# Patient Record
Sex: FEMALE | Race: WHITE
[De-identification: names, ages, dates, MRNs, and addresses within clinical notes are randomized per-mention and may not be internally consistent; named-entity substitution may affect disease eponyms.]

---

## 2018-09-30 ENCOUNTER — HOSPITAL ENCOUNTER (EMERGENCY)
Dept: HOSPITAL 97 - ER | Age: 68
Discharge: HOME | End: 2018-09-30
Payer: COMMERCIAL
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
## 2018-09-30 DIAGNOSIS — E87.6: ICD-10-CM

## 2018-09-30 DIAGNOSIS — Z88.2: ICD-10-CM
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
## 2018-09-30 DIAGNOSIS — E78.00: ICD-10-CM

## 2018-09-30 DIAGNOSIS — I10: ICD-10-CM

## 2018-09-30 DIAGNOSIS — G89.29: Primary | ICD-10-CM

## 2018-09-30 LAB
ALBUMIN SERPL BCP-MCNC: 4 G/DL (ref 3.4–5)
ALP SERPL-CCNC: 127 U/L (ref 45–117)
ALT SERPL W P-5'-P-CCNC: 43 U/L (ref 12–78)
AST SERPL W P-5'-P-CCNC: 36 U/L (ref 15–37)
BUN BLD-MCNC: 12 MG/DL (ref 7–18)
GLUCOSE SERPLBLD-MCNC: 110 MG/DL (ref 74–106)
HCT VFR BLD CALC: 41.5 % (ref 36–45)
INR BLD: 1.06
LYMPHOCYTES # SPEC AUTO: 2.9 K/UL (ref 0.7–4.9)
MAGNESIUM SERPL-MCNC: 2.1 MG/DL (ref 1.8–2.4)
MCH RBC QN AUTO: 32.3 PG (ref 27–35)
MCV RBC: 90.4 FL (ref 80–100)
NT-PROBNP SERPL-MCNC: 116 PG/ML (ref ?–125)
PMV BLD: 8.4 FL (ref 7.6–11.3)
POTASSIUM SERPL-SCNC: 3.2 MMOL/L (ref 3.5–5.1)
RBC # BLD: 4.59 M/UL (ref 3.86–4.86)
TROPONIN (EMERG DEPT USE ONLY): < 0.02 NG/ML (ref 0–0.04)

## 2018-09-30 PROCEDURE — 85025 COMPLETE CBC W/AUTO DIFF WBC: CPT

## 2018-09-30 PROCEDURE — 71045 X-RAY EXAM CHEST 1 VIEW: CPT

## 2018-09-30 PROCEDURE — 72192 CT PELVIS W/O DYE: CPT

## 2018-09-30 PROCEDURE — 93005 ELECTROCARDIOGRAM TRACING: CPT

## 2018-09-30 PROCEDURE — 83735 ASSAY OF MAGNESIUM: CPT

## 2018-09-30 PROCEDURE — 96374 THER/PROPH/DIAG INJ IV PUSH: CPT

## 2018-09-30 PROCEDURE — 96375 TX/PRO/DX INJ NEW DRUG ADDON: CPT

## 2018-09-30 PROCEDURE — 73552 X-RAY EXAM OF FEMUR 2/>: CPT

## 2018-09-30 PROCEDURE — 36415 COLL VENOUS BLD VENIPUNCTURE: CPT

## 2018-09-30 PROCEDURE — 72131 CT LUMBAR SPINE W/O DYE: CPT

## 2018-09-30 PROCEDURE — 83880 ASSAY OF NATRIURETIC PEPTIDE: CPT

## 2018-09-30 PROCEDURE — 80076 HEPATIC FUNCTION PANEL: CPT

## 2018-09-30 PROCEDURE — 85610 PROTHROMBIN TIME: CPT

## 2018-09-30 PROCEDURE — 84484 ASSAY OF TROPONIN QUANT: CPT

## 2018-09-30 PROCEDURE — 99284 EMERGENCY DEPT VISIT MOD MDM: CPT

## 2018-09-30 PROCEDURE — 72170 X-RAY EXAM OF PELVIS: CPT

## 2018-09-30 PROCEDURE — 73502 X-RAY EXAM HIP UNI 2-3 VIEWS: CPT

## 2018-09-30 PROCEDURE — 80048 BASIC METABOLIC PNL TOTAL CA: CPT

## 2018-09-30 NOTE — XMS REPORT
[facility]

 Created on:2016



Patient:Lily Bennett

Sex:Female

:1950

External Reference #:[id number]





Demographics







 Address  [address] [place] [id number]

 

 Phone  [phone]

 

 Preferred Language  Unknown

 

 Marital Status  Unknown

 

 Zoroastrianism Affiliation  Unknown

 

 Race  Unknown

 

 Ethnic Group  Unknown









Author







 Organization  [employer]









Care Team Providers







 Name  Role  Phone

 

 Fer Barrera  Provider Role  Unavailable









Allergies, Adverse Reactions, Alerts







 Substance  Reaction  Event Type

 

 Sulfa Allergy  Info Not Available  Drug Allergy







Encounters







 Encounter  Location  Date

 

 Echo report  [facility]  Dec 28, 2016

 

 Unknown  [facility]  Dec 08, 2016







Problems







 Problem Type  Condition  ICD-9 Code  Onset Dates  Condition Status

 

 Assessment  Reflux esophagitis  K21.0    Active

 

 Assessment  Essential (primary) hypertension  I10    Active

 

 Assessment  Hyperlipidemia, unspecified  E78.5    Active

 

 Problem  Hyperlipidemia, unspecified  E78.5    Active

 

 Problem  Reflux esophagitis  K21.0    Active

 

 Problem  Essential (primary) hypertension  I10    Active

 

 Assessment  Chest pain, unspecified  R07.9    Active

 

 Assessment  Palpitations  R00.2    Active

 

 Problem  Chest pain, unspecified  R07.9    Active

 

 Problem  Palpitations  R00.2    Active







Medications







 Medication  Code System  Code  Instructions  Start  End Date  Status  Dosage



         Date      

 

 carvedilol  MULTUM  82512  25 mg orally 2      Active  1 tab(s)



       times a day        

 

 Zantac 150  MULTUM  4742  150 mg orally qd      Active  1 tab(s)

 

 cyclobenzaprine  MULTUM  04777  10 mg orally 3      Active  1 tab(s)



       times a day        

 

 Crestor  MULTUM  31849  10 mg orally      Active  1 tab(s)



       once a day (at        



       bedtime)        

 

 Nexium  MULTUM  44286  40 mg orally      Active  1 cap(s)



       once a day        

 

 Colace  MULTUM  4971  sodium 100 mg      Active  1 cap(s)



       orally 2 times a        



       day        

 

 spironolactone  MULTUM  86316  25 mg orally      Active  1 tab(s)



       daily        

 

 Hydrocodone/Acetam  Unknown  0        Active  Unknown

 

 hydrochlorothiazide  MULTUM  68231  12.5 mg-100 mg      Active  1 tab(s)



 -losartan      orally once a        



       day        







Social History







 Social History Element  Qualifiers  Date Reported

 

 Caffeine:  yes.  frequency:  Dec 08, 2016

 

 Physical Activities:   yes.  Dec 08, 2016

 

 Tobacco Use:  .  Smoking Status: never smoker  Dec 08, 2016

 

 Occupation:  .  retired  Dec 08, 2016







Vital Signs







 Date/Time:  Dec 08, 2016

 

 Blood Pressure Diastolic  80 mm Hg

 

 Blood Pressure Systolic  118 mm Hg

 

 Weight  175 lbs

 

 Height  59 in







Summary Purpose

[employer] Submission

## 2018-09-30 NOTE — RAD REPORT
EXAM DESCRIPTION:  RAD - Chest Single View - 9/30/2018 4:59 am

 

CLINICAL HISTORY:  COUGH

Chest pain.

 

COMPARISON:  Chest Pa And Lat (2 Views) dated 1/18/2018; Chest Single View dated 1/18/2018; CHEST PA 
AND LAT 2 VIEW dated 11/6/2012; CHEST PA AND LAT 2 VIEW dated 3/22/2002

 

FINDINGS:  Portable technique limits examination quality.

 

The lungs are grossly clear. The heart is normal in size. No displaced fractures.

 

IMPRESSION:  No acute intrathoracic process suspected.

## 2018-09-30 NOTE — EDPHYS
Physician Documentation                                                                           

 [facility]                                                                

Name: Lily Bennett                                                                                

Age: 68 yrs                                                                                       

Sex: Female                                                                                       

: 1950                                                                                   

MRN: [id number]                                                                                   

Arrival Date: 2018                                                                          

Time: 04:00                                                                                       

Account#: [id number]                                                                            

Bed 5                                                                                             

Private MD: Ry Blanchard V                                                                      

ED Physician Nicolas Ingram                                                                      

HPI:                                                                                              

                                                                                             

04:06 This 68 yrs old  Female presents to ER via Wheelchair with complaints of Hip   pablito 

      Pain.                                                                                       

04:06 The patient or guardian reports decreased range of motion, pain. that occurred at home, pablito 

      sustained from a fall, There is no obvious deformity, The patient is able to ambulate       

      with assistance. The patient is able to bear partial body weight. There is no radiation     

      of the patient's discomfort. The complaints affect the pelvis. Onset: The                   

      symptoms/episode began/occurred just prior to arrival. Modifying factors: The symptoms      

      are alleviated by remaining still, the symptoms are aggravated by any movement.             

      Associated signs and symptoms: Pertinent positives: None. Severity of symptoms: At          

      their worst the symptoms were moderate, in the emergency department the symptoms have       

      improved, moderately. The patient has not experienced similar symptoms in the past.         

                                                                                                  

Historical:                                                                                       

- Allergies:                                                                                      

04:08 Sulfa (Sulfonamide Antibiotics);                                                        fc  

- Home Meds:                                                                                      

04:08 Norco 5-325 mg Oral tab 1 tab twice a day [Active]; carvedilol 25 mg oral tab 1 tab 2   fc  

      times per day [Active];                                                                     

04:24 Crestor 10 mg oral tab 1 tab once daily [Active]; omeprazole 40 mg Oral cpDR 1 cap once fc  

      daily [Active]; oxybutynin chloride 10 mg Oral tr24 1 tab daily [Active];                   

      losartan-hydrochlorothiazide 100-12.5 mg oral tab 1 tab once daily [Active]; tizanidine     

      2 mg oral tab 1 tabs three times a day [Active];                                            

- PMHx:                                                                                           

04:08 Chronic pain; Hypertension; High Cholesterol; GERD;                                     fc  

- PSHx:                                                                                           

04:08 bialteral hip replacements; Knee surgery; back surg; bilateral wrist surg;              fc  

      Hysterectomy; Cholecystectomy; hand surg;                                                   

                                                                                                  

- Immunization history:: Last tetanus immunization: unknown, Flu vaccine is not up to             

  date.                                                                                           

- Social history:: Smoking status: Patient/guardian denies using tobacco.                         

- Ebola Screening: : Patient negative for fever greater than or equal to 101.5 degrees            

  Fahrenheit, and additional compatible Ebola Virus Disease symptoms Patient denies               

  exposure to infectious person Patient denies travel to an Ebola-affected area in the            

  21 days before illness onset.                                                                   

- Family history:: not pertinent.                                                                 

                                                                                                  

                                                                                                  

ROS:                                                                                              

04:06 Constitutional: Negative for fever, chills, and weight loss, Eyes: Negative for injury, pablito 

      pain, redness, and discharge, ENT: Negative for injury, pain, and discharge, Neck:          

      Negative for injury, pain, and swelling, Cardiovascular: Negative for chest pain,           

      palpitations, and edema, Respiratory: Negative for shortness of breath, cough,              

      wheezing, and pleuritic chest pain, Abdomen/GI: Negative for abdominal pain, nausea,        

      vomiting, diarrhea, and constipation, Back: Negative for injury and pain, : Negative      

      for injury, bleeding, discharge, and swelling, Skin: Negative for injury, rash, and         

      discoloration, Neuro: Negative for headache, weakness, numbness, tingling, and seizure,     

      Psych: Negative for depression, anxiety, suicide ideation, homicidal ideation, and          

      hallucinations, Allergy/Immunology: Negative for hives, rash, and allergies, Endocrine:     

      Negative for neck swelling, polydipsia, polyuria, polyphagia, and marked weight             

      changes, Hematologic/Lymphatic: Negative for swollen nodes, abnormal bleeding, and          

      unusual bruising.                                                                           

04:06 MS/extremity: Positive for decreased range of motion, pain, swelling, tenderness, of        

      the pelvis, left hip, left gluteal fold, left inner thigh, left upper thigh and left        

      quadriceps.                                                                                 

                                                                                                  

Exam:                                                                                             

04:06 Constitutional:  This is a well developed, well nourished patient who is awake, alert,  pablito 

      and in no acute distress. Head/Face:  Normocephalic, atraumatic. Eyes:  Pupils equal        

      round and reactive to light, extra-ocular motions intact.  Lids and lashes normal.          

      Conjunctiva and sclera are non-icteric and not injected.  Cornea within normal limits.      

      Periorbital areas with no swelling, redness, or edema. ENT:  Nares patent. No nasal         

      discharge, no septal abnormalities noted.  Tympanic membranes are normal and external       

      auditory canals are clear.  Oropharynx with no redness, swelling, or masses, exudates,      

      or evidence of obstruction, uvula midline.  Mucous membranes moist. Neck:  Trachea          

      midline, no thyromegaly or masses palpated, and no cervical lymphadenopathy.  Supple,       

      full range of motion without nuchal rigidity, or vertebral point tenderness.  No            

      Meningismus. Chest/axilla:  Normal chest wall appearance and motion.  Nontender with no     

      deformity.  No lesions are appreciated. Cardiovascular:  Regular rate and rhythm with a     

      normal S1 and S2.  No gallops, murmurs, or rubs.  Normal PMI, no JVD.  No pulse             

      deficits. Respiratory:  Lungs have equal breath sounds bilaterally, clear to                

      auscultation and percussion.  No rales, rhonchi or wheezes noted.  No increased work of     

      breathing, no retractions or nasal flaring. Abdomen/GI:  Soft, non-tender, with normal      

      bowel sounds.  No distension or tympany.  No guarding or rebound.  No evidence of           

      tenderness throughout. Skin:  Warm, dry with normal turgor.  Normal color with no           

      rashes, no lesions, and no evidence of cellulitis. Neuro:  Awake and alert, GCS 15,         

      oriented to person, place, time, and situation.  Cranial nerves II-XII grossly intact.      

      Motor strength 5/5 in all extremities.  Sensory grossly intact.  Cerebellar exam            

      normal.  Normal gait. Psych:  Awake, alert, with orientation to person, place and time.     

       Behavior, mood, and affect are within normal limits.                                       

04:06 Back: ROM is painful, normal spinal alignment noted, CVA tenderness, is absent,             

      vertebral tenderness, is not appreciated, muscle spasm, is appreciated in the left low      

      back, left mid back, right mid back and right low back.                                     

                                                                                                  

Vital Signs:                                                                                      

04:00  / 80; Pulse 84; Resp 20; Temp 97.6(O); Pulse Ox 100% on R/A; Weight 79.83 kg     fc  

      (R); Height 5 ft. 9 in. (175.26 cm) (R); Pain 10/10;                                        

05:23  / 75; Pulse 78; Resp 20; Pulse Ox 100% on R/A; Pain 8/10;                        fc  

06:32  / 68; Pulse 72; Resp 20; Pulse Ox 100% on R/A; Pain 3/10;                        fc  

06:48  / 76; Pulse 76; Resp 20; Temp 98.0(O); Pulse Ox 100% on R/A; Pain 2/10;          fc  

04:00 Body Mass Index 25.99 (79.83 kg, 175.26 cm)                                             fc  

                                                                                                  

MDM:                                                                                              

04:09 Data reviewed: vital signs, nurses notes, lab test result(s), EKG, radiologic studies,  [facility] [place] 

      CT scan, plain films.                                                                       

04:10 Patient medically screened.                                                             [facility] [place] 

                                                                                                  

                                                                                             

04:06 Order name: Basic Metabolic Panel                                                       [facility] [place] 

                                                                                             

04:06 Order name: CBC with Diff; Complete Time: 05:26                                         [facility] [place] 

                                                                                             

04:06 Order name: LFT's                                                                       [facility] [place] 

                                                                                             

04:06 Order name: Magnesium; Complete Time: 05:                                             [facility] [place] 

                                                                                             

04:06 Order name: NT PRO-BNP; Complete Time: 05:                                            [facility] [place] 

                                                                                             

04:06 Order name: PT-INR; Complete Time: 05:                                                [facility] [place] 

                                                                                             

04:06 Order name: Troponin (emerg Dept Use Only); Complete Time: 05:                        [facility] [place] 

                                                                                             

04:06 Order name: XRAY Chest (1 view)                                                         [facility] [place] 

                                                                                             

04:06 Order name: Pelvis XRAY                                                                 [facility] [place] 

                                                                                             

04:06 Order name: CT Lumbar Spine Wo Con                                                      [facility] [place] 

                                                                                             

04:06 Order name: Hip Left 2 View XRAY                                                        [facility] [place] 

                                                                                             

04:06 Order name: Femur Left XRAY                                                             [facility] 

                                                                                             

04:07 Order name: Basic Metabolic Panel; Complete Time: 05:26                                 [facility]

                                                                                             

04:07 Order name: Liver (Hepatic) Function; Complete Time: 05:                              [facility]

                                                                                             

04:06 Order name: EKG; Complete Time: 04:07                                                   [facility] [place] 

                                                                                             

04:06 Order name: Cardiac monitoring; Complete Time: 04:                                    [facility] [place] 

                                                                                             

04:06 Order name: EKG - Nurse/Tech; Complete Time: 04:18                                      [facility] [place] 

                                                                                             

04:06 Order name: IV Saline Lock; Complete Time: 04:                                        [facility] [place] 

                                                                                             

04:06 Order name: Labs collected and sent; Complete Time: 04:09                               [facility] [place] 

                                                                                             

04:06 Order name: O2 Per Protocol; Complete Time: 04:                                       [facility] [place] 

                                                                                             

04:06 Order name: O2 Sat Monitoring; Complete Time: 04:                                     [facility] [place] 

                                                                                             

04:06 Order name: Femur Right XRAY                                                            [facility] [place] 

                                                                                             

05:45 Order name: Pelvis Wo Cont                                                              [facility]

                                                                                                  

Administered Medications:                                                                         

04:15 Drug: Zofran 4 mg Route: IVP; Site: right antecubital;                                  fc  

04:45 Follow up: Response: No adverse reaction; Nausea is decreased                           fc  

04:17 Drug: fentaNYL (PF) 50 mcg Route: IVP; Site: right antecubital;                         fc  

04:45 Follow up: Response: No adverse reaction; Nausea is decreased                           fc  

04:45 Follow up: Response: No adverse reaction; Pain is decreased                             fc  

05:18 Drug: Zofran 4 mg Route: IVP; Site: right antecubital;                                  fc  

05:46 Follow up: Response: No adverse reaction; Nausea is decreased                             

05:20 Drug: fentaNYL (PF) 50 mcg Route: IVP; Site: right antecubital;                         fc  

05:46 Follow up: Response: No adverse reaction; Pain is decreased                             fc  

06:32 Drug: Potassium Effervescent Tablet 25 mEq Route: PO;                                     

06:49 Follow up: Response: No adverse reaction; No change in condition                          

                                                                                                  

                                                                                                  

Disposition:                                                                                      

18 05:30 Discharged to Home. Impression: Fall due to bumping against object, Pain in        

  left hip, Other chronic pain, Hypokalemia.                                                      

- Condition is Stable.                                                                            

- Discharge Instructions: Joint Pain, Back Pain, Adult, Chronic Back Pain, Chronic                

  Pain, Potassium Content of Foods, Fall Prevention in the Home, Musculoskeletal Pain,            

  Back Pain, Adult, Easy-to-Read, Hip Pain.                                                       

- Prescriptions for Tylenol- Codeine #3 300-30 mg Oral Tablet - take 2 tablet by ORAL             

  route every 6 hours As needed; 30 tablet. Valium 2 mg Oral Tablet - take 1 tablet by            

  ORAL route every 8 hours As needed; 20 tablet.                                                  

- Medication Reconciliation Form, Thank You Letter, Antibiotic Education, Prescription            

  Opioid Use form.                                                                                

- Follow up: Ry Blanchard MD; When: 2 - 3 days; Reason: Recheck today's complaints,             

  Continuance of care, Re-evaluation by your physician. Follow up: Laz Alexander MD;            

  When: 2 - 3 days; Reason: Recheck today's complaints, Re-evaluation by your physician.          

- Problem is new.                                                                                 

- Symptoms have improved.                                                                         

                                                                                                  

                                                                                                  

                                                                                                  

Signatures:                                                                                       

Dispatcher [employer]                           Nicolas Ozuna MD MD cha Chretien, Felicia RN                   RN   fc                                                   

                                                                                                  

Corrections: (The following items were deleted from the chart)                                    

04:24 04:08 Home Meds: Crestor 20 mg oral tab 1 tab once daily; fc                              

04:24 04:08 Home Meds: Nexium 40 mg Oral cpDR 1 cap once daily; fc                              

04:24 04:08 Home Meds: Vesicare 10 mg oral tab 1 tab once daily; fc                           fc  

04:24 04:08 Home Meds: losartan 100 mg oral tab 1 tab once daily; fc                            

06:09 05:45 Hip Left Wo Con ordered. [facility]                                                     [facility]

06:09 05:45 Hip Right Wo Con ordered. [facility]                                                    [facility]

06:26 05:30 2018 05:30 Discharged to Home. Impression: Fall due to bumping against      pablito 

      object; Pain in left hip; Other chronic pain; Hypokalemia. Condition is Stable. Forms       

      are Medication Reconciliation Form, Thank You Letter, Antibiotic Education,                 

      Prescription Opioid Use. Follow up: Ry Blanchard; When: 2 - 3 days; Reason: Recheck         

      today's complaints, Continuance of care, Re-evaluation by your physician. Problem is        

      new. Symptoms have improved. [facility]                                                            

07:16 06:26 2018 05:30 Discharged to Home. Impression: Fall due to bumping against      fc  

      object; Pain in left hip; Other chronic pain; Hypokalemia. Condition is Stable.             

      Discharge Instructions: Joint Pain, Chronic Pain, Potassium Content of Foods, Fall          

      Prevention in the Home, Musculoskeletal Pain, Hip Pain, Back Pain, Adult, Chronic Back      

      Pain, Back Pain, Adult, Easy-to-Read. Prescriptions for Tylenol-Codeine #3 300-30 mg        

      Oral Tablet - take 2 tablet by ORAL route every 6 hours As needed; 30 tablet, Valium 2      

      mg Oral Tablet - take 1 tablet by ORAL route every 8 hours As needed; 20 tablet. and        

      Forms are Medication Reconciliation Form, Thank You Letter, Antibiotic Education,           

      Prescription Opioid Use. Follow up: Ry Blanchard; When: 2 - 3 days; Reason: Recheck         

      today's complaints, Continuance of care, Re-evaluation by your physician. Follow up:        

      Laz Alexander; When: 2 - 3 days; Reason: Recheck today's complaints, Re-evaluation by       

      your physician. Problem is new. Symptoms have improved. pablito                                 

                                                                                                  

**************************************************************************************************

## 2018-09-30 NOTE — RAD REPORT
EXAM DESCRIPTION:  RAD - Femur Right - 9/30/2018 5:00 am

 

CLINICAL HISTORY:  PAIN

History of fall

 

COMPARISON:  No comparisons

 

FINDINGS:  Right total hip arthroplasty is noted. Right total knee arthroplasty also seen. No hardwar
e complication suspected. No acute fracture seen.

## 2018-09-30 NOTE — XMS REPORT
[facility]

 Created on:2016



Patient:Lily Bennett

Sex:Female

:1950

External Reference #:[id number]





Demographics







 Address  [address] [place] [id number]

 

 Phone  [phone]

 

 Preferred Language  Unknown

 

 Marital Status  Unknown

 

 [employer] Affiliation  Unknown

 

 Race  Unknown

 

 Ethnic Group  Unknown









Author







 Organization  [employer]









Care Team Providers







 Name  Role  Phone

 

 Fer Barrera  Provider Role  Unavailable









Encounters







 Encounter  Location  Date

 

 Echo report  [facility]  Dec 28, 2016







Problems







 Problem Type  Condition  ICD-9 Code  Onset Dates  Condition Status

 

 Problem  Hyperlipidemia, unspecified  E78.5    Active

 

 Problem  Reflux esophagitis  K21.0    Active

 

 Problem  Essential (primary) hypertension  I10    Active

 

 Problem  Chest pain, unspecified  R07.9    Active

 

 Problem  Palpitations  R00.2    Active







Social History







 Social History Element  Qualifiers  Date Reported

 

 Caffeine:  yes.  frequency:  Dec 08, 2016

 

 Physical Activities:   yes.  Dec 08, 2016

 

 Tobacco Use:  .  Smoking Status: never smoker  Dec 08, 2016

 

 Occupation:  .  retired  Dec 08, 2016







Summary Purpose

[employer] Submission

## 2018-09-30 NOTE — ER
Nurse's Notes                                                                                     

 [facility]                                                                

Name: Lily Bennett                                                                                

Age: 68 yrs                                                                                       

Sex: Female                                                                                       

: 1950                                                                                   

MRN: [id number]                                                                                   

Arrival Date: 2018                                                                          

Time: 04:00                                                                                       

Account#: [id number]                                                                            

Bed 5                                                                                             

Private MD: Ry Blanchard V                                                                      

Diagnosis: Fall due to bumping against object;Pain in left hip;Other chronic pain;Hypokalemia     

                                                                                                  

Presentation:                                                                                     

                                                                                             

04:00 Presenting complaint: Patient states: that she feel one week ago off the toilet and     fc  

      hurt her left hip. States that she heard something pop. Pain has gotten progressively       

      worse since then. Transition of care: patient was not received from another setting of      

      care. Onset of symptoms was 2018. Risk Assessment: Do you want to hurt        

      yourself or someone else? Patient reports no desire to harm self or others. Initial         

      Sepsis Screen: Does the patient meet any 2 criteria? No. Patient's initial sepsis           

      screen is negative. Does the patient have a suspected source of infection? No.              

      Patient's initial sepsis screen is negative. Care prior to arrival: None.                   

04:00 Method Of Arrival: Wheelchair                                                           fc  

04:00 Acuity: BUFFY 3                                                                           fc  

                                                                                                  

Triage Assessment:                                                                                

04:00 General: Appears distressed, uncomfortable, slender, well groomed, Behavior is          fc  

      cooperative, appropriate for age, anxious. Pain: Complains of pain in left hip Pain         

      currently is 10 out of 10 on a pain scale. Quality of pain is described as aching,          

      sharp, shooting, Pain began 1 week ago Is continuous, Aggravated by increased activity,     

      repositioning, weight bearing. EENT: No deficits noted. Neuro: Level of Consciousness       

      is awake, alert, obeys commands, Oriented to person, place, time, situation.                

      Cardiovascular: No deficits noted. Respiratory: No deficits noted. GI: No deficits          

      noted. : No deficits noted. Derm: Skin is pink, warm \T\ dry. Musculoskeletal:            

      Capillary refill < 3 seconds, Range of motion: limited in left hip.                         

                                                                                                  

Historical:                                                                                       

- Allergies:                                                                                      

04:08 Sulfa (Sulfonamide Antibiotics);                                                        fc  

- Home Meds:                                                                                      

04:08 Norco 5-325 mg Oral tab 1 tab twice a day [Active]; carvedilol 25 mg oral tab 1 tab 2   fc  

      times per day [Active];                                                                     

04:24 Crestor 10 mg oral tab 1 tab once daily [Active]; omeprazole 40 mg Oral cpDR 1 cap once fc  

      daily [Active]; oxybutynin chloride 10 mg Oral tr24 1 tab daily [Active];                   

      losartan-hydrochlorothiazide 100-12.5 mg oral tab 1 tab once daily [Active]; tizanidine     

      2 mg oral tab 1 tabs three times a day [Active];                                            

- PMHx:                                                                                           

04:08 Chronic pain; Hypertension; High Cholesterol; GERD;                                     fc  

- PSHx:                                                                                           

04:08 bialteral hip replacements; Knee surgery; back surg; bilateral wrist surg;              fc  

      Hysterectomy; Cholecystectomy; hand surg;                                                   

                                                                                                  

- Immunization history:: Last tetanus immunization: unknown, Flu vaccine is not up to             

  date.                                                                                           

- Social history:: Smoking status: Patient/guardian denies using tobacco.                         

- Ebola Screening: : Patient negative for fever greater than or equal to 101.5 degrees            

  Fahrenheit, and additional compatible Ebola Virus Disease symptoms Patient denies               

  exposure to infectious person Patient denies travel to an Ebola-affected area in the            

  21 days before illness onset.                                                                   

- Family history:: not pertinent.                                                                 

                                                                                                  

                                                                                                  

Screenin:00 Abuse screen: Denies threats or abuse. Nutritional screening: No deficits noted.        fc  

      Tuberculosis screening: No symptoms or risk factors identified. Fall Risk None              

      identified.                                                                                 

                                                                                                  

Assessment:                                                                                       

04:05 Reassessment: No changes from previously documented assessment. Patient and/or family   fc  

      updated on plan of care and expected duration. Pain level reassessed. Patient is alert,     

      oriented x 3, equal unlabored respirations, skin warm/dry/pink. See triage assessment.      

      Dr Ingarm at bedside to examine pt.                                                       

04:26 Reassessment: Pt has gone to radiology dept for xrays via stretcher.                    fc  

05:11 Reassessment: No changes from previously documented assessment. Patient and/or family   fc  

      updated on plan of care and expected duration. Pain level reassessed. Patient is alert,     

      oriented x 3, equal unlabored respirations, skin warm/dry/pink. Pt has returned from        

      radiology. Having increased pain. Discussed with Dr Ingram. Pt to get Zofran and          

      Fentanyl.                                                                                   

05:46 Reassessment: Pt attempted to move in the bed and pain is back. Dr Ingram in to see   fc  

      and talk with pt. Pt to get CT of hips and pelvis before discharge.                         

06:33 Reassessment: No changes from previously documented assessment. Patient and/or family   fc  

      updated on plan of care and expected duration. Pain level reassessed. Patient is alert,     

      oriented x 3, equal unlabored respirations, skin warm/dry/pink. Pt given medication as      

      ordered. Is pending results of last Ct Scan.                                                

06:48 Reassessment: Dr Ingram discussed results with pt and  along with discharge    fc  

      instructions.                                                                               

                                                                                                  

Vital Signs:                                                                                      

04:00  / 80; Pulse 84; Resp 20; Temp 97.6(O); Pulse Ox 100% on R/A; Weight 79.83 kg     fc  

      (R); Height 5 ft. 9 in. (175.26 cm) (R); Pain 10/10;                                        

05:23  / 75; Pulse 78; Resp 20; Pulse Ox 100% on R/A; Pain 8/10;                        fc  

06:32  / 68; Pulse 72; Resp 20; Pulse Ox 100% on R/A; Pain 3/10;                        fc  

06:48  / 76; Pulse 76; Resp 20; Temp 98.0(O); Pulse Ox 100% on R/A; Pain 2/10;          fc  

04:00 Body Mass Index 25.99 (79.83 kg, 175.26 cm)                                               

                                                                                                  

ED Course:                                                                                        

04:00 Patient arrived in ED.                                                                  fc  

04:00 Ry Blanchard MD is Private Physician.                                                 fc  

04:00 Arm band placed on Patient placed in an exam room, on a stretcher.                      fc  

04:00 Patient has correct armband on for positive identification. Placed in gown. Bed in low  fc  

      position. Call light in reach. Side rails up X2. Pulse ox on. NIBP on.                      

04:00 Initial lab(s) drawn, by me. Inserted saline lock: 20 gauge in right antecubital area,  cc  

      using aseptic technique. Blood collected.                                                   

04:01 Triage completed.                                                                       fc  

04:03 Nicolas Ingram MD is Attending Physician.                                             [facility] 

04:15 X-ray completed. Patient tolerated procedure poorly. Patient moved to radiology via     ag1 

      stretcher.                                                                                  

04:21 EKG done, by ED staff, reviewed by Nicolas Ingram MD.                                   cc  

04:49 Radiology exam delayed due to PATIENT IS CURRENTLY IN XRAY.                             ag1 

04:58 XRAY Chest (1 view) In Process Unspecified.                                             EDMS

04:58 Pelvis XRAY In Process Unspecified.                                                     EDMS

04:58 Hip Left 2 View XRAY In Process Unspecified.                                            EDMS

04:58 Femur Left XRAY In Process Unspecified.                                                 EDMS

04:58 Femur Right XRAY In Process Unspecified.                                                EDMS

05:16 CT Lumbar Spine Wo Con In Process Unspecified.                                          EDMS

05:27 Ry Blanchard MD is Referral Physician.                                                [facility] 

06:06 Pelvis Wo Cont In Process Unspecified.                                                  EDMS

06:26 Laz Alexander MD is Referral Physician.                                               [facility] 

07:13 No provider procedures requiring assistance completed. IV discontinued, intact,         fc  

      bleeding controlled, No redness/swelling at site. Pressure dressing applied.                

                                                                                                  

Administered Medications:                                                                         

04:15 Drug: Zofran 4 mg Route: IVP; Site: right antecubital;                                  fc  

04:45 Follow up: Response: No adverse reaction; Nausea is decreased                           fc  

04:17 Drug: fentaNYL (PF) 50 mcg Route: IVP; Site: right antecubital;                         fc  

04:45 Follow up: Response: No adverse reaction; Nausea is decreased                           fc  

04:45 Follow up: Response: No adverse reaction; Pain is decreased                             fc  

05:18 Drug: Zofran 4 mg Route: IVP; Site: right antecubital;                                  fc  

05:46 Follow up: Response: No adverse reaction; Nausea is decreased                           fc  

05:20 Drug: fentaNYL (PF) 50 mcg Route: IVP; Site: right antecubital;                         fc  

05:46 Follow up: Response: No adverse reaction; Pain is decreased                             fc  

06:32 Drug: Potassium Effervescent Tablet 25 mEq Route: PO;                                   fc  

06:49 Follow up: Response: No adverse reaction; No change in condition                        fc  

                                                                                                  

                                                                                                  

Outcome:                                                                                          

05:30 Discharge ordered by MD.                                                                [facility] 

07:15 Discharged to home via wheelchair, with significant other.                              fc  

07:15 Condition: good                                                                             

07:15 Discharge instructions given to patient, significant other, Instructed on discharge         

      instructions, follow up and referral plans. no drinking with medication, no driving         

      heavy equipment, medication usage, Demonstrated understanding of instructions,              

      follow-up care, medications, Prescriptions given X 2.                                       

07:16 Patient left the ED.                                                                    fc  

                                                                                                  

Signatures:                                                                                       

Dispatcher [employer]                           Nicolas Ozuna MD MD cha Chretien, Felicia, RN                   RN                                                      

Aby Villar Ashley                             ag1                                                  

                                                                                                  

Corrections: (The following items were deleted from the chart)                                    

04:24 04:08 Home Meds: Crestor 20 mg oral tab 1 tab once daily; [facility]  

04: 04:08 Home Meds: Nexium 40 mg Oral cpDR 1 cap once daily; [facility]  

04::08 Home Meds: Vesicare 10 mg oral tab 1 tab once daily; [facility]  

04:: Home Meds: losartan 100 mg oral tab 1 tab once daily; [facility]  

                                                                                                  

**************************************************************************************************

## 2018-09-30 NOTE — RAD REPORT
EXAM DESCRIPTION:  CT - Spine Lumbar Wo Con - 9/30/2018 7:33 am

 

CLINICAL HISTORY:   Radiculopathy.

PAIN

 

COMPARISON:  L Spine With Bending Views dated 9/28/2018; Lumbar Spine 3 Views dated 5/10/2017

 

TECHNIQUE:  Axial noncontrast CT imaging of the lumbar spine was performed with coronal and sagittal 
re-formatted images.

 

All CT scans are performed using dose optimization technique as appropriate and may include automated
 exposure control or mA/KV adjustment according to patient size.

 

FINDINGS:  Postsurgical changes are present of posterior fusion spanning L2-5 with hardware in place.
 Chronic mild anterolisthesis of L3 on L4 and L4 on L5 is noted. Multilevel facet arthrosis is noted.


 

No acute fracture suspected.

 

Ossified disc protrusions along the right aspect at at L3-4 and L4-5 noted. Mild right-sided foramina
l encroachment is caused by these.

 

IMPRESSION:  No acute lumbar spine abnormality.

 

Extensive postsurgical fusion changes L2-5. No hardware failure suspected.

## 2018-09-30 NOTE — RAD REPORT
EXAM DESCRIPTION:  CT - Pelvis Wo Cont - 9/30/2018 7:33 am

 

CLINICAL HISTORY:  PAIN

Fall

 

COMPARISON:  No comparisons

 

TECHNIQUE:  All CT scans are performed using dose optimization technique as appropriate and may inclu
de automated exposure control or mA/KV adjustment according to patient size.

 

FINDINGS:  Postsurgical changes are present lower lumbar spine. Bilateral hip arthroplasties. No hard
ware complication evident.

 

No acute fracture or subluxation.

 

IMPRESSION:  No acute abnormality detected.

## 2018-09-30 NOTE — EKG
Test Date:    2018-09-30               Test Time:    04:20:08

Technician:   [facility]                                    

                                                     

MEASUREMENT RESULTS:                                       

Intervals:                                           

Rate:         75                                     

DC:           172                                    

QRSD:         80                                     

QT:           388                                    

QTc:          433                                    

Axis:                                                

P:            68                                     

DC:           172                                    

QRS:          72                                     

T:            78                                     

                                                     

INTERPRETIVE STATEMENTS:                                       

                                                     

Normal sinus rhythm

ST abnormality, possible digitalis effect

Abnormal ECG

No previous ECG available for comparison



Electronically Signed On 09-30-18 06:40:34 CDT by Talha Fisher

## 2018-09-30 NOTE — XMS REPORT
Continuity of Care Document

 Created on:2016



Patient:TABBY KEITA

Sex:Female

:1950

External Reference #:[id number]





Demographics







 Address  [address] [place] [address]

 

 Phone  [phone]

 

 Preferred Language  Unknown

 

 Marital Status  Unknown

 

 Jew Affiliation  Unknown

 

 Race  Unknown

 

 Ethnic Group  Unknown









Author







 Organization  [facility]









Problems







 Problem  Status  Onset  Classification  Date  Comments  Source



     Date    Reported    



             



             

 

 Hyperlipidemia,  Active    Diagnosis  2016    [facility]



 unspecified            [facility]



             [facility]



             



             

 

 Reflux  Active    Diagnosis  2016    [facility]



 esophagitis            [facility]



             [facility]



             



             

 

 Essential  Active    Diagnosis  2016    [facility]



 hypertension            [facility]



             [facility]



             



             

 

 Chest pain,  Active    Diagnosis  2016    [facility]



 unspecified            [facility]



             [facility]



             



             

 

 Palpitations  Active    Diagnosis  2016    [facility]



             [facility]



             [facility]



             



             







Medications







 Medication  Details  Route  Status  Patient  Ordering  Order  Source



         Instructions  Provider  Date  



               



               



               

 

 carvedilol  1 tab(s)  orally  Active  25 mg orally 2  [facility]    [facility]



         times a day      [facility]



               [facility]



               



               

 

 Zantac 150  1 tab(s)  orally  Active  150 mg orally  [facility]    [facility]



         qd      [facility]



               [facility]



               



               

 

 cyclobenzaprine  1 tab(s)  orally  Active  10 mg orally 3  [facility]    [facility]



         times a day      [facility]



               [facility]



               



               

 

 Crestor  1 tab(s)  orally  Active  10 mg orally  [facility]    [facility]



         once a day (at      [facility]



         bedtime)      [facility]



               



               

 

 Nexium  1 cap(s)  orally  Active  40 mg orally  [facility]    [facility]



         once a day      [facility]



               [facility]



               



               

 

 Colace  1 cap(s)  orally  Active  sodium 100 mg  [facility]    [facility]



         orally 2 times      [facility]



         a day      [facility]



               



               

 

 spironolactone  1 tab(s)  orally  Active  25 mg orally  [facility]    [facility]



         daily      [facility]



               [facility]



               



               

 

 Hydrocodone/Acetam  Unknown  NA  Active    [facility]    [facility]



               [facility]



               [facility]



               



               

 

 hydrochlorothiazid  1 tab(s)  orally  Active  12.5 mg-100 mg  [facility]    [facility]



 e-losartan        orally once a      [facility]



         day      [facility]



               



               







Allergies, Adverse Reactions, Alerts







 Substance  Category  Reaction  Severity  Reaction  Status  Date  Comments  
Source



         type    Reported    



                 



                 



                 

 

 Sulfa  Adverse  Info Not    Adverse  Active      Comp



 Allergy  Reaction  Available    Reaction    6    [facility]



                 [facility]



                 



                 







Immunizations







 Immunization  Date Given  Site  Status  Last Updated  Comments  Source



             



             







Results







 Order  Results  Value  Reference  Date  Interpretation  Comments  Source



 Name      Range        



               



               







Vital Signs







 Vital Sign  Value  Date  Comments  Source



         

 

 Diastolic (mm Hg)  80  2016    [facility]



         



         

 

 Systolic (mm Hg)  118  2016    [facility]



         



         

 

 Weight  175  2016    [facility]



         



         

 

 Height  59  2016    [facility]



         



         







Encounters







 Location  Location  Encounter  Encounter  Reason  Attending  ADM  DC  Status  
Source



   Details  Type  Number  For  Provider  Date  Date    



         Visit          



                   



                   



                   

 

 Comprehensive    Unknown  [id number]          [facility]



 [facility]      [id number]      /2016    [facility]



       [id number]            [facility]



       [id number]            



                   



                   

 

 Comprehensive    Echo  [id number]          [facility]



 [facility]    report  [id number]      /2016    [facility]



       [id number]            Care



       [id number]            



                   



                   

 

 Comprehensive    Echo  [id number]          [facility]



 [facility]    report  [id number]      /2016    [facility]



       [id number]            Care



       [id number]            



                   



                   







Procedures







 Procedure  Code  Date  Perfomer  Comments  Source

## 2018-09-30 NOTE — RAD REPORT
EXAM DESCRIPTION:  RAD - Hip Left 2 View - 9/30/2018 5:00 am

 

CLINICAL HISTORY:  PAIN

 History of fall

 

FINDINGS:

 

AP pelvis, left hip and left femur - multiple projections are submitted.

 

Bilateral hip arthroplasties are noted. No unexpected or worrisome hardware finding. Evidence of bony
 fusion in the lower lumbar spine seen. No acute fracture or subluxation seen.

## 2018-10-01 NOTE — RAD REPORT
EXAM DESCRIPTION:  RAD - Femur Left - 9/30/2018 5:00 am

 

CLINICAL HISTORY:  PAIN

History of fall

 

FINDINGS:  AP pelvis, left hip and left femur - multiple projections are submitted.

 

Bilateral hip arthroplasties are noted. No unexpected or worrisome hardware finding. Evidence of bony
 fusion in the lower lumbar spine seen. No acute fracture or subluxation seen.

## 2018-10-01 NOTE — RAD REPORT
EXAM DESCRIPTION:  RAD - Pelvis - 9/30/2018 5:00 am

 

CLINICAL HISTORY:  PAIN

History of fall

 

FINDINGS:  AP pelvis, left hip and left femur - multiple projections are submitted.

 

Bilateral hip arthroplasties are noted. No unexpected or worrisome hardware finding. Evidence of bony
 fusion in the lower lumbar spine seen. No acute fracture or subluxation seen.

## 2025-05-03 ENCOUNTER — HOSPITAL ENCOUNTER (EMERGENCY)
Dept: HOSPITAL 97 - ER | Age: 75
Discharge: HOME | End: 2025-05-03
Payer: COMMERCIAL

## 2025-05-03 VITALS — OXYGEN SATURATION: 100 %

## 2025-05-03 VITALS — DIASTOLIC BLOOD PRESSURE: 78 MMHG | TEMPERATURE: 98 F | SYSTOLIC BLOOD PRESSURE: 148 MMHG

## 2025-05-03 DIAGNOSIS — M25.511: ICD-10-CM
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
## 2025-05-03 DIAGNOSIS — M54.9: ICD-10-CM

## 2025-05-03 DIAGNOSIS — G80.9: ICD-10-CM

## 2025-05-03 DIAGNOSIS — S50.11XA: Primary | ICD-10-CM

## 2025-05-03 PROCEDURE — 72128 CT CHEST SPINE W/O DYE: CPT

## 2025-05-03 PROCEDURE — 72192 CT PELVIS W/O DYE: CPT

## 2025-05-03 PROCEDURE — 72131 CT LUMBAR SPINE W/O DYE: CPT

## 2025-05-03 PROCEDURE — 99284 EMERGENCY DEPT VISIT MOD MDM: CPT
